# Patient Record
Sex: FEMALE | Race: WHITE
[De-identification: names, ages, dates, MRNs, and addresses within clinical notes are randomized per-mention and may not be internally consistent; named-entity substitution may affect disease eponyms.]

---

## 2019-12-16 ENCOUNTER — HOSPITAL ENCOUNTER (EMERGENCY)
Dept: HOSPITAL 53 - M ED | Age: 74
LOS: 1 days | Discharge: HOME | End: 2019-12-17
Payer: MEDICARE

## 2019-12-16 VITALS — BODY MASS INDEX: 42.42 KG/M2 | HEIGHT: 62 IN | WEIGHT: 230.49 LBS

## 2019-12-16 DIAGNOSIS — M10.9: ICD-10-CM

## 2019-12-16 DIAGNOSIS — E66.01: ICD-10-CM

## 2019-12-16 DIAGNOSIS — I10: ICD-10-CM

## 2019-12-16 DIAGNOSIS — E78.5: ICD-10-CM

## 2019-12-16 DIAGNOSIS — Z91.012: ICD-10-CM

## 2019-12-16 DIAGNOSIS — Z79.84: ICD-10-CM

## 2019-12-16 DIAGNOSIS — E11.9: ICD-10-CM

## 2019-12-16 DIAGNOSIS — Z79.899: ICD-10-CM

## 2019-12-16 DIAGNOSIS — N28.1: Primary | ICD-10-CM

## 2019-12-16 DIAGNOSIS — R10.9: ICD-10-CM

## 2019-12-16 DIAGNOSIS — Z88.5: ICD-10-CM

## 2019-12-16 DIAGNOSIS — M48.061: ICD-10-CM

## 2019-12-16 LAB
ALBUMIN SERPL BCG-MCNC: 3.4 GM/DL (ref 3.2–5.2)
ALT SERPL W P-5'-P-CCNC: 34 U/L (ref 12–78)
BASOPHILS # BLD AUTO: 0 10^3/UL (ref 0–0.2)
BASOPHILS NFR BLD AUTO: 0.3 % (ref 0–1)
BILIRUB CONJ SERPL-MCNC: 0.2 MG/DL (ref 0–0.2)
BILIRUB SERPL-MCNC: 0.9 MG/DL (ref 0.2–1)
BUN SERPL-MCNC: 11 MG/DL (ref 7–18)
CALCIUM SERPL-MCNC: 8.9 MG/DL (ref 8.8–10.2)
CHLORIDE SERPL-SCNC: 106 MEQ/L (ref 98–107)
CO2 SERPL-SCNC: 28 MEQ/L (ref 21–32)
CREAT SERPL-MCNC: 0.98 MG/DL (ref 0.55–1.3)
EOSINOPHIL # BLD AUTO: 0.2 10^3/UL (ref 0–0.5)
EOSINOPHIL NFR BLD AUTO: 2.2 % (ref 0–3)
GFR SERPL CREATININE-BSD FRML MDRD: 59.4 ML/MIN/{1.73_M2} (ref 39–?)
GLUCOSE SERPL-MCNC: 110 MG/DL (ref 70–100)
HCT VFR BLD AUTO: 46.7 % (ref 36–47)
HGB BLD-MCNC: 15.3 G/DL (ref 12–15.5)
LIPASE SERPL-CCNC: 143 U/L (ref 73–393)
LYMPHOCYTES # BLD AUTO: 3.7 10^3/UL (ref 1.5–5)
LYMPHOCYTES NFR BLD AUTO: 36.2 % (ref 24–44)
MCH RBC QN AUTO: 30.6 PG (ref 27–33)
MCHC RBC AUTO-ENTMCNC: 32.8 G/DL (ref 32–36.5)
MCV RBC AUTO: 93.4 FL (ref 80–96)
MONOCYTES # BLD AUTO: 1 10^3/UL (ref 0–0.8)
MONOCYTES NFR BLD AUTO: 10.1 % (ref 0–5)
NEUTROPHILS # BLD AUTO: 5.2 10^3/UL (ref 1.5–8.5)
NEUTROPHILS NFR BLD AUTO: 50.9 % (ref 36–66)
PLATELET # BLD AUTO: 170 10^3/UL (ref 150–450)
POTASSIUM SERPL-SCNC: 4.1 MEQ/L (ref 3.5–5.1)
PROT SERPL-MCNC: 7 GM/DL (ref 6.4–8.2)
RBC # BLD AUTO: 5 10^6/UL (ref 4–5.4)
SODIUM SERPL-SCNC: 143 MEQ/L (ref 136–145)
WBC # BLD AUTO: 10.2 10^3/UL (ref 4–10)

## 2019-12-17 VITALS — SYSTOLIC BLOOD PRESSURE: 168 MMHG | DIASTOLIC BLOOD PRESSURE: 77 MMHG

## 2019-12-17 NOTE — REPVR
PROCEDURE INFORMATION: 

Exam: CT Abdomen And Pelvis Without Contrast 

Exam date and time: 12/17/2019 12:04 AM 

Age: 74 years old 

Clinical history: Abdominal pain; Flank; Right; Additional info: Right flank 

pain 



TECHNIQUE: 

Imaging protocol: Computed tomography of the abdomen and pelvis without 

contrast. 

Radiation optimization: All CT scans at this facility use at least one of these 

dose optimization techniques: automated exposure control; mA and/or kV 

adjustment per patient size (includes targeted exams where dose is matched to 

clinical indication); or iterative reconstruction. 



COMPARISON: 

CT ABD PELVIS WITH CONTRAST 3/17/2016 9:31 PM 



FINDINGS: 



Liver: Normal. No mass. 

Gallbladder and bile ducts: Normal. No calcified stones. No ductal dilation. 

Pancreas: Normal. No ductal dilation. 

Spleen: Normal. No splenomegaly. 

Adrenals: Normal. No mass. 

Kidneys and ureters: Interval increase in size of exophytic lesion arising from 

the upper pole of the right kidney measuring 2.7 cm with average internal 

density of 20 Hounsfield units. No other renal masses. No hydronephrosis. Small 

non-obstructing calyceal stone in the right kidney. 

Stomach and bowel: Unremarkable. No obstruction. No mucosal thickening. 

Appendix: No evidence of appendicitis. 

Intraperitoneal space: Unremarkable. No free air. No significant fluid 

collection. 

Vasculature: Unremarkable. No abdominal aortic aneurysm. 

Lymph nodes: Unremarkable. No enlarged lymph nodes. 



Bladder: Unremarkable as visualized. 

Reproductive: Unremarkable as visualized. 

Bones/joints: There are advanced degenerative changes in the spine and pelvis. 

Grade 1 anterolisthesis at L4-L5 causing a severe central spinal canal 

stenosis. 

Soft tissues: Unremarkable. 



IMPRESSION: 

1. Interval increase in size of mass in the upper pole of the right kidney. 

This may be a complicated cyst but is indeterminate. Consider follow-up renal 

ultrasound. Nonobstructing calyceal stone in the right kidney. No 

hydronephrosis. 

2. Severe spinal stenosis at L4-L5. 



Electronically signed by: Pankaj Wilson On 12/17/2019  01:03:41 AM

## 2019-12-17 NOTE — REP
ABDOMEN SERIES:  Three views.

 

HISTORY:  Abdomen pain.

 

Comparison chest x-ray is from April 10, 2006.  There is a comparison digital KUB

from CT study March 17, 2016.

 

FINDINGS:  Upright chest radiograph shows a small zone of linear fibrosis in the

left perihilar region.  The lungs are otherwise clear.   The pleural angles are

sharp.  There is no evidence of infiltrate or free subdiaphragmatic air.  Heart

is not enlarged.  There is a minimal dextroconvex curve in the thoracic spine.

An orthopedic anchor is noted in the right humeral head and there is

periarticular soft-tissue calcification at the right shoulder.

 

Supine and erect views of the abdomen demonstrate a normal bowel gas pattern with

air and stool in a nondistended colon.  Psoas margins and flank stripes are

intact.  No mass organomegaly is seen.  There is a triangular calcific opacity

just lateral to the left mid psoas margin.  This is not visible on the 2016 prior

CT study.  A genitourinary calculus cannot be excluded.  It measures 7 mm.

 

There are degenerative changes in the lumbar spine and bilateral hip

osteoarthritic spurring is noted.

 

IMPRESSION: Normal bowel gas pattern.  No active disease in the chest.  A 7 mm

triangular calcification in the left mid abdomen of uncertain etiology.

 

 

Electronically Signed by

Juve Flores MD 12/17/2019 03:24 P